# Patient Record
Sex: FEMALE | Employment: FULL TIME | URBAN - METROPOLITAN AREA
[De-identification: names, ages, dates, MRNs, and addresses within clinical notes are randomized per-mention and may not be internally consistent; named-entity substitution may affect disease eponyms.]

---

## 2021-11-03 ENCOUNTER — HOSPITAL ENCOUNTER (OUTPATIENT)
Dept: NUTRITION | Age: 61
Discharge: HOME | End: 2021-11-03
Payer: SELF-PAY

## 2021-11-03 VITALS — HEIGHT: 59 IN

## 2021-11-03 PROCEDURE — 97802 MEDICAL NUTRITION INDIV IN: CPT

## 2021-11-03 NOTE — PROGRESS NOTES
[place], LAMONT, LD  Outpatient Registered Dietitian  [address] [facility]  Phone: [phone]  Fax: [phone]    ASSESSMENT  Pt is a 62 y.o. female referred with the following diagnosis (es):  Pure hypercholesterolemia, unspecified [E78.00]     Voiced motivation to improve cholesterol with nutrition. Does not want to be on medication for cholesterol. Labs: 2021 lipid panel WNL, as pt was taking medication. 7/2020 lipid panel: TC-204, TG-62, HDL-63, LDL-131, VLDL-12    Meds: reviewed    Barriers to change: nutrition knowledge deficit, competing values and established food preferences/eating behaviors    Social Support: none voiced    Diet Recall:   Food choices include   Breakfast- instant oats with milk, half and half, and brown sugar. Eats 1/4-1/3c nuts. Consumes black coffee with 2 tsp of added sugar. Snack- candy bar or cookie. Later has an apple or banana. Lunch- leftovers from dinner the night before    Snack- grapes, strawberries    Dinner- chicken with vegetables, brats with tater tots or a sweet potato  Eating pattern appears:   Excessive in saturated/trans fat and carbohydrate; consuming mostly refined/simple carbohydrates.  Inadequate in protein, fiber, dairy products, vegetables and fruit. Physical Activity: exercises 3-4x per week    Sleep Habits: unknown    Behaviors indicate current stage of change is: Preparation. Anthropometrics:   Ht: 4'1\"     Wt: 65.77kg     Estimated Nutrition Needs:  MSJ= 1133.35 kcal/d  EEN to maintain CBW = MSJ x 1.2  (1360kcal/d)  Protein: 68g/day (20%)     DIAGNOSIS:  Unbalanced diet related to nutrition related knowledge deficit as evidenced by diet recall high in saturated fat and added sugar, inadequate in fiber compared to nutrition prescription. .    INTERVENTIONS:  1. 60 minute appointment    2.    Nutrition Prescription  Recommended Modifications of Meals and Snacks (RD Goals for pt):  - Increase fiber intake from baseline, with goal of 5-10g from soluble fiber/day  - Choose low glycemic, fiber rich whole foods over highly processed foods  - Limit added sugars, refined carbohydrates, processed meat intake (\"treat foods\") to 2x/week or less  - Replace SSB with water  - Replace saturated fat with unsaturated fat, eliminate trans fat    3. Nutrition Education and Counseling   Reviewed labs and MNT Guidelines for high cholesterol- specifically emphasized benefit of increasing soluble fiber, decreasing saturated fat, adding protein to her breakfast, and increasing total fiber from baseline.  Discussed added sugar recommendations.  Demonstrated label reading for saturated fat and fiber.  Discussed high soluble fiber foods and strategies to implement. Materials Provided and Discussed:  o Cardiovascular Disease MNT Guidelines  o Anti-inflammatory [employer] Food Choices handout     Utilized the following behavior change strategies: motivational interviewing, stimulus control and cognitive restructuring  Reviewed motivations for change and elicited change talk. Pt identified behaviors address and collaborated in goal setting. Explained health benefits associated with recommended modifications. Pt verbalized understanding of recommendations discussed. Anticipate fair compliance with recommendations. Goal: Pt will structure meals and change food choices to facilitate a reduction in LDL from baseline within 6 months. MONITORING & EVALUATION:  Monitor food/nutrient intake and lab values  Follow Up: Not set. RD contact information provided, pt to contact RD office to schedule follow up PRN.

## 2022-02-11 ENCOUNTER — TELEPHONE (OUTPATIENT)
Dept: NUTRITION | Age: 62
End: 2022-02-11

## 2022-02-11 NOTE — TELEPHONE ENCOUNTER
Nutrition Counseling: Contacted pt regarding referral. See notes documented in Nutrition Counseling Referral for details. No further follow-up contact from pt. Will close referral for this office and notify referring provider.     [address] [facility]  [phone]